# Patient Record
Sex: FEMALE | Race: BLACK OR AFRICAN AMERICAN | NOT HISPANIC OR LATINO | Employment: FULL TIME | URBAN - METROPOLITAN AREA
[De-identification: names, ages, dates, MRNs, and addresses within clinical notes are randomized per-mention and may not be internally consistent; named-entity substitution may affect disease eponyms.]

---

## 2019-08-09 PROBLEM — Z72.0 TOBACCO ABUSE: Status: ACTIVE | Noted: 2017-11-14

## 2019-08-09 PROBLEM — K62.5 BLEEDING PER RECTUM: Status: ACTIVE | Noted: 2017-11-14

## 2019-08-19 PROBLEM — Z80.0 FAMILY HISTORY OF COLON CANCER: Status: ACTIVE | Noted: 2017-11-21

## 2019-08-20 PROBLEM — H02.889 MEIBOMIAN GLAND DYSFUNCTION: Status: ACTIVE | Noted: 2019-08-20

## 2019-08-20 PROBLEM — H40.1131 PRIMARY OPEN ANGLE GLAUCOMA (POAG) OF BOTH EYES, MILD STAGE: Status: ACTIVE | Noted: 2019-08-20

## 2019-08-20 PROBLEM — E11.9 TYPE 2 DIABETES MELLITUS WITHOUT COMPLICATION, WITHOUT LONG-TERM CURRENT USE OF INSULIN: Status: ACTIVE | Noted: 2019-08-20

## 2019-08-20 PROBLEM — H25.813 COMBINED FORMS OF AGE-RELATED CATARACT OF BOTH EYES: Status: ACTIVE | Noted: 2019-08-20

## 2019-08-20 PROBLEM — H35.033 HYPERTENSIVE RETINOPATHY OF BOTH EYES: Status: ACTIVE | Noted: 2019-08-20

## 2022-06-08 PROBLEM — R10.32 LEFT INGUINAL PAIN: Status: ACTIVE | Noted: 2022-06-08

## 2022-06-08 PROBLEM — Z01.419 WELL WOMAN EXAM: Status: ACTIVE | Noted: 2022-06-08

## 2022-06-08 PROBLEM — R10.30 GROIN PAIN: Status: ACTIVE | Noted: 2022-06-08

## 2023-12-04 ENCOUNTER — PATIENT OUTREACH (OUTPATIENT)
Dept: ADMINISTRATIVE | Facility: HOSPITAL | Age: 58
End: 2023-12-04
Payer: COMMERCIAL

## 2024-01-11 PROBLEM — K13.70 DISEASE OF THE ORAL SOFT TISSUES: Status: ACTIVE | Noted: 2024-01-11

## 2024-02-29 PROBLEM — F17.200 SMOKING: Status: ACTIVE | Noted: 2017-11-14

## 2024-06-24 PROBLEM — R10.32 LEFT INGUINAL PAIN: Status: RESOLVED | Noted: 2022-06-08 | Resolved: 2024-06-24

## 2024-06-24 PROBLEM — E11.65 TYPE 2 DIABETES MELLITUS WITH HYPERGLYCEMIA, WITHOUT LONG-TERM CURRENT USE OF INSULIN: Status: ACTIVE | Noted: 2019-08-20

## 2024-06-24 PROBLEM — E66.813 CLASS 3 OBESITY: Status: ACTIVE | Noted: 2024-06-24

## 2024-06-24 PROBLEM — K62.5 BLEEDING PER RECTUM: Status: RESOLVED | Noted: 2017-11-14 | Resolved: 2024-06-24

## 2025-03-19 PROBLEM — R06.09 DOE (DYSPNEA ON EXERTION): Status: ACTIVE | Noted: 2025-03-19

## 2025-04-28 ENCOUNTER — TELEPHONE (OUTPATIENT)
Dept: PHARMACY | Facility: CLINIC | Age: 60
End: 2025-04-28
Payer: COMMERCIAL

## 2025-04-28 NOTE — TELEPHONE ENCOUNTER
[facility]/Population Health Chart Review & Patient Outreach Details For Medication Adherence Project    Reason for Outreach Encounter: 3rd Party payor non-compliance report ([employer], [employer], [employer], etc)  2.  Patient Outreach Method: Reviewed Patient Chart  3.   Medication in question: lisinopril    LAST FILLED: 2/26/25 for 90 day supply  Hypertension Medications              hydroCHLOROthiazide (HYDRODIURIL) 25 MG tablet Take 1 tablet (25 mg total) by mouth once daily.    lisinopriL (PRINIVIL,ZESTRIL) 40 MG tablet Take 1 tablet (40 mg total) by mouth once daily.              4.  Reviewed and or Updates Made To: Patient Chart  5. Outreach Outcomes and/or actions taken: Patient filled medication and is on track to be adherent

## 2025-06-11 PROBLEM — E66.01 MORBID (SEVERE) OBESITY DUE TO EXCESS CALORIES: Status: ACTIVE | Noted: 2025-06-11

## 2025-06-23 ENCOUNTER — TELEPHONE (OUTPATIENT)
Dept: PHARMACY | Facility: CLINIC | Age: 60
End: 2025-06-23
Payer: COMMERCIAL

## 2025-06-23 NOTE — TELEPHONE ENCOUNTER
[facility]/Population Health Chart Review & Patient Outreach Details For Medication Adherence Project    Reason for Outreach Encounter: 3rd Party payor non-compliance report ([employer], [employer], [employer], etc)  2.  Patient Outreach Method: Reviewed patient chart   3.   Medication in question:    Hypertension Medications              hydroCHLOROthiazide (HYDRODIURIL) 25 MG tablet Take 1 tablet (25 mg total) by mouth once daily.    lisinopriL (PRINIVIL,ZESTRIL) 40 MG tablet Take 1 tablet (40 mg total) by mouth once daily.                   last filled  6/11/25 for 90 day supply      4.  Reviewed and or Updates Made To: Patient Chart  5. Outreach Outcomes and/or actions taken: Patient filled medication and is on track to be adherent  Additional Notes:

## 2025-06-26 ENCOUNTER — TELEPHONE (OUTPATIENT)
Dept: PHARMACY | Facility: CLINIC | Age: 60
End: 2025-06-26
Payer: COMMERCIAL

## 2025-06-26 NOTE — TELEPHONE ENCOUNTER
[facility]/Population Health Chart Review & Patient Outreach Details For Medication Adherence Project    Reason for Outreach Encounter: 3rd Party payor non-compliance report ([employer], [employer], [facility], etc)  2.  Patient Outreach Method: [employer] message  3.   Medication in question: metformin    LAST FILLED: 2/27/25 for 90 day supply  Diabetes Medications              metFORMIN (GLUCOPHAGE) 1000 MG tablet Take 1 tablet (1,000 mg total) by mouth daily with breakfast.    semaglutide (OZEMPIC) 0.25 mg or 0.5 mg (2 mg/3 mL) pen injector Inject 0.25 mg into the skin every 7 days. for 5 doses              4.  Reviewed and or Updates Made To: Patient Chart  5. Outreach Outcomes and/or actions taken: Sent inquiry to patient: Waiting for response.

## 2025-07-02 ENCOUNTER — TELEPHONE (OUTPATIENT)
Dept: PHARMACY | Facility: CLINIC | Age: 60
End: 2025-07-02
Payer: COMMERCIAL

## 2025-07-02 NOTE — TELEPHONE ENCOUNTER
[facility]/Population Health Chart Review & Patient Outreach Details For Medication Adherence Project     1.Reason for Outreach Encounter: 3rd Party payor non-compliance report ([employer], [employer], [facility], etc)  2.  Patient Outreach Method: [employer] message  3.   Medication in question: Metformin 1000mg  LAST FILLED: 2/27/25 for 90 day supply    Diabetes Medications              metFORMIN (GLUCOPHAGE) 1000 MG tablet Take 1 tablet (1,000 mg total) by mouth daily with breakfast.    semaglutide (OZEMPIC) 0.25 mg or 0.5 mg (2 mg/3 mL) pen injector Inject 0.25 mg into the skin every 7 days. for 5 doses              4.  Reviewed and or Updates Made To: Patient Chart  5. Outreach Outcomes and/or actions taken: Sent inquiry to patient: Waiting for response.